# Patient Record
Sex: FEMALE | Race: WHITE
[De-identification: names, ages, dates, MRNs, and addresses within clinical notes are randomized per-mention and may not be internally consistent; named-entity substitution may affect disease eponyms.]

---

## 2019-07-09 ENCOUNTER — HOSPITAL ENCOUNTER (EMERGENCY)
Dept: HOSPITAL 54 - ER | Age: 44
Discharge: HOME | End: 2019-07-09
Payer: MEDICAID

## 2019-07-09 VITALS — HEIGHT: 65 IN | BODY MASS INDEX: 32.49 KG/M2 | WEIGHT: 195 LBS

## 2019-07-09 VITALS — DIASTOLIC BLOOD PRESSURE: 70 MMHG | SYSTOLIC BLOOD PRESSURE: 130 MMHG

## 2019-07-09 DIAGNOSIS — R42: ICD-10-CM

## 2019-07-09 DIAGNOSIS — T40.7X1A: Primary | ICD-10-CM

## 2019-07-09 DIAGNOSIS — R00.0: ICD-10-CM
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
## 2019-07-09 LAB
ALBUMIN SERPL BCP-MCNC: 3.7 G/DL (ref 3.4–5)
ALP SERPL-CCNC: 65 U/L (ref 46–116)
ALT SERPL W P-5'-P-CCNC: 26 U/L (ref 12–78)
AST SERPL W P-5'-P-CCNC: 16 U/L (ref 15–37)
BASOPHILS # BLD AUTO: 0 /CMM (ref 0–0.2)
BASOPHILS NFR BLD AUTO: 0.4 % (ref 0–2)
BILIRUB DIRECT SERPL-MCNC: 0.1 MG/DL (ref 0–0.2)
BILIRUB SERPL-MCNC: 0.3 MG/DL (ref 0.2–1)
BILIRUB UR QL STRIP: (no result)
BUN SERPL-MCNC: 17 MG/DL (ref 7–18)
CALCIUM SERPL-MCNC: 9.3 MG/DL (ref 8.5–10.1)
CHLORIDE SERPL-SCNC: 105 MMOL/L (ref 98–107)
CO2 SERPL-SCNC: 22 MMOL/L (ref 21–32)
CREAT SERPL-MCNC: 0.9 MG/DL (ref 0.6–1.3)
EOSINOPHIL NFR BLD AUTO: 1.2 % (ref 0–6)
GLUCOSE SERPL-MCNC: 120 MG/DL (ref 74–106)
HCT VFR BLD AUTO: 39 % (ref 33–45)
HGB BLD-MCNC: 13.1 G/DL (ref 11.5–14.8)
LYMPHOCYTES NFR BLD AUTO: 2 /CMM (ref 0.8–4.8)
LYMPHOCYTES NFR BLD AUTO: 24.8 % (ref 20–44)
MCHC RBC AUTO-ENTMCNC: 34 G/DL (ref 31–36)
MCV RBC AUTO: 89 FL (ref 82–100)
MONOCYTES NFR BLD AUTO: 0.3 /CMM (ref 0.1–1.3)
MONOCYTES NFR BLD AUTO: 3.4 % (ref 2–12)
NEUTROPHILS # BLD AUTO: 5.7 /CMM (ref 1.8–8.9)
NEUTROPHILS NFR BLD AUTO: 70.2 % (ref 43–81)
PH UR STRIP: 5.5 [PH] (ref 5–8)
PLATELET # BLD AUTO: 289 /CMM (ref 150–450)
POTASSIUM SERPL-SCNC: 3.6 MMOL/L (ref 3.5–5.1)
PROT SERPL-MCNC: 7.2 G/DL (ref 6.4–8.2)
PROT UR QL STRIP: 100 MG/DL
RBC # BLD AUTO: 4.39 MIL/UL (ref 4–5.2)
RBC #/AREA URNS HPF: (no result) /HPF (ref 0–2)
SODIUM SERPL-SCNC: 140 MMOL/L (ref 136–145)
UROBILINOGEN UR STRIP-MCNC: 0.2 EU/DL
WBC #/AREA URNS HPF: (no result) /HPF (ref 0–3)
WBC NRBC COR # BLD AUTO: 8.1 K/UL (ref 4.3–11)

## 2019-07-09 PROCEDURE — 36415 COLL VENOUS BLD VENIPUNCTURE: CPT

## 2019-07-09 PROCEDURE — 80076 HEPATIC FUNCTION PANEL: CPT

## 2019-07-09 PROCEDURE — 99284 EMERGENCY DEPT VISIT MOD MDM: CPT

## 2019-07-09 PROCEDURE — 96360 HYDRATION IV INFUSION INIT: CPT

## 2019-07-09 PROCEDURE — 93005 ELECTROCARDIOGRAM TRACING: CPT

## 2019-07-09 PROCEDURE — 71045 X-RAY EXAM CHEST 1 VIEW: CPT

## 2019-07-09 PROCEDURE — 81001 URINALYSIS AUTO W/SCOPE: CPT

## 2019-07-09 PROCEDURE — 96374 THER/PROPH/DIAG INJ IV PUSH: CPT

## 2019-07-09 PROCEDURE — 80305 DRUG TEST PRSMV DIR OPT OBS: CPT

## 2019-07-09 PROCEDURE — 85025 COMPLETE CBC W/AUTO DIFF WBC: CPT

## 2019-07-09 PROCEDURE — 80048 BASIC METABOLIC PNL TOTAL CA: CPT

## 2019-07-09 PROCEDURE — 84484 ASSAY OF TROPONIN QUANT: CPT

## 2019-07-09 PROCEDURE — 96361 HYDRATE IV INFUSION ADD-ON: CPT

## 2019-07-09 NOTE — NUR
PATIENT DENIES PAIN OR DISCOMFORT. PIV REMOVED, IV FLUIDS COMPLETED. Patient 
discharged to home in stable condition. Daughter at bedside. Written and verbal 
after care instructions given. Patient verbalizes understanding of instruction.
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
